# Patient Record
Sex: FEMALE | Race: OTHER | HISPANIC OR LATINO
[De-identification: names, ages, dates, MRNs, and addresses within clinical notes are randomized per-mention and may not be internally consistent; named-entity substitution may affect disease eponyms.]

---

## 2023-05-16 PROBLEM — Z00.00 ENCOUNTER FOR PREVENTIVE HEALTH EXAMINATION: Status: ACTIVE | Noted: 2023-05-16

## 2023-05-24 ENCOUNTER — APPOINTMENT (OUTPATIENT)
Dept: UROGYNECOLOGY | Facility: CLINIC | Age: 73
End: 2023-05-24
Payer: MEDICARE

## 2023-05-24 VITALS
SYSTOLIC BLOOD PRESSURE: 135 MMHG | DIASTOLIC BLOOD PRESSURE: 81 MMHG | WEIGHT: 177 LBS | TEMPERATURE: 98.2 F | OXYGEN SATURATION: 96 % | RESPIRATION RATE: 15 BRPM | HEART RATE: 73 BPM

## 2023-05-24 PROCEDURE — 51701 INSERT BLADDER CATHETER: CPT

## 2023-05-24 PROCEDURE — 99204 OFFICE O/P NEW MOD 45 MIN: CPT | Mod: 25

## 2023-05-24 NOTE — DISCUSSION/SUMMARY
[FreeTextEntry1] : \par Images were used to demonstrate her incontinence and treatment options. We reviewed management options for stress urinary incontinence including: observation, pelvic floor exercises with or without a physical therapist, continence devices or pessaries, periurethral bulking agents, and surgical management. We discussed surgical management options including a midurethral sling, sheridan colposuspension, and pubovaginal sling using native tissue. We reviewed risks and benefits of each procedure. If interested in surgery, we discussed obtaining urodynamics. We also discussed the perioperative course and reviewed postoperative restrictions of complete pelvic rest and avoidance of strenuous exercise/heavy lifting (>10lb) for 6 weeks. \par \par At this time, she would like to start with PFE with physical therapy. Referral given to patient. \par \par [facility] handout on KIT and PFE given to patient in Hungarian. RTO 3 months for KIT follow up.

## 2023-05-24 NOTE — PHYSICAL EXAM
[Chaperone Present] : A chaperone was present in the examining room during all aspects of the physical examination [Labia Majora] : were normal [Labia Minora] : were normal [Normal Appearance] : general appearance was normal [Atrophy] : atrophy [No Bleeding] : there was no active vaginal bleeding [Normal] : no abnormalities [Exam Deferred] : was deferred [Scar] : a scar was noted [FreeTextEntry1] : General: Well, appearing. Alert and orientated. No acute distress\par HEENT: Normocephalic, atraumatic and extraocular muscles appear to be intact \par Neck: Full range of motion, no obvious lymphadenopathy, deformities, or masses noted \par Respiratory: Speaking in full sentences comfortably, normal work of breathing and no cough during visit\par Musculoskeletal: full range of motion\par Extremities: No upper extremity edema noted\par Skin: no obvious rash or skin lesions\par Neuro: Orientated X 3, speech is fluent, normal rate\par Psych: Normal mood and affect  [Tenderness] : ~T no ~M abdominal tenderness observed [Distended] : not distended [FreeTextEntry3] : (+) hypermobility, (-) cough stress test

## 2023-05-24 NOTE — REASON FOR VISIT
[Initial Visit ___] : an initial visit for [unfilled] [Urinary Incontinence] : urinary incontinence [[employer] ] : provided by [employer]   [Interpreters_IDNumber] : [id number] [Interpreters_FullName] : Ronnie [TWNoteComboBox1] : [place]

## 2023-05-24 NOTE — HISTORY OF PRESENT ILLNESS
[Cystocele (Obstetric)] : no [Vaginal Wall Prolapse] : no [Rectal Prolapse] : no [Unable To Restrain Bowel Movement] : no [Urinary Frequency] : no [Feelings Of Urinary Urgency] : no [Pain During Urination (Dysuria)] : no [Urinary Tract Infection] : no [] : years ago [Stool Visible Blood] : no [Incomplete Emptying Of Stool] : no [Pelvic Pain] : no [Vaginal Pain] : no [Rectal Pain] : no [de-identified] : with running, sneezing, every other day  [de-identified] : 3-4x/d [de-identified] : 0-1x/n [FreeTextEntry6] : daily BM, sometimes has constipation, ~1x/m  [FreeTextEntry1] : \par Jie is a 73-year-old who presents with bothersome leakage with laughing, sneezing. Denies any leakage with urge. Denies frequency, urgency, incomplete emptying. Denies prior evaluation or treatment. 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 Occasionally 1 can of soda

## 2023-05-24 NOTE — PROCEDURE
[Straight Catheterization] : insertion of a straight catheter [Urinary Tract Infection] : a urinary tract infection [___ Fr Straight Tip] : a [unfilled] in [place] straight tip catheter [None] : there were no complications with the catheter insertion [Clear] : clear [Culture] : culture

## 2023-05-24 NOTE — LETTER BODY
[Dear  ___] : Dear  [unfilled], [I had the pleasure of evaluating your patient, [unfilled]. Thank you for referring Ms. [unfilled] for consultation for ___] : I had the pleasure of evaluating your patient, [unfilled]. Thank you for referring Ms. [unfilled] for consultation for [unfilled]. [Attached please find my note.] : Attached please find my note. [Thank you very much for allowing me to participate in the care of this patient. If you have any questions, please do not hesitate to contact me] : Thank you very much for allowing me to participate in the care of this patient. If you have any questions, please do not hesitate to contact me. [FreeTextEntry1] : stress incontinence

## 2023-05-26 LAB — BACTERIA UR CULT: NORMAL

## 2023-09-19 ENCOUNTER — APPOINTMENT (OUTPATIENT)
Dept: UROGYNECOLOGY | Facility: CLINIC | Age: 73
End: 2023-09-19

## 2023-10-31 ENCOUNTER — APPOINTMENT (OUTPATIENT)
Dept: UROGYNECOLOGY | Facility: CLINIC | Age: 73
End: 2023-10-31

## 2024-06-11 ENCOUNTER — APPOINTMENT (OUTPATIENT)
Dept: UROGYNECOLOGY | Facility: CLINIC | Age: 74
End: 2024-06-11
Payer: MEDICARE

## 2024-06-11 VITALS
BODY MASS INDEX: 30.65 KG/M2 | WEIGHT: 173 LBS | TEMPERATURE: 97.9 F | DIASTOLIC BLOOD PRESSURE: 72 MMHG | OXYGEN SATURATION: 98 % | HEART RATE: 67 BPM | SYSTOLIC BLOOD PRESSURE: 130 MMHG | HEIGHT: 63 IN

## 2024-06-11 DIAGNOSIS — R35.1 NOCTURIA: ICD-10-CM

## 2024-06-11 PROCEDURE — 51701 INSERT BLADDER CATHETER: CPT

## 2024-06-11 PROCEDURE — 99213 OFFICE O/P EST LOW 20 MIN: CPT | Mod: 25

## 2024-06-11 PROCEDURE — 99459 PELVIC EXAMINATION: CPT

## 2024-06-11 NOTE — PROCEDURE
[Straight Catheterization] : insertion of a straight catheter [Urinary Tract Infection] : a urinary tract infection [___ Fr Straight Tip] : a [unfilled] in [place] straight tip catheter [None] : there were no complications with the catheter insertion [Clear] : clear [Culture] : culture

## 2024-06-11 NOTE — HISTORY OF PRESENT ILLNESS
[Cystocele (Obstetric)] : no [Vaginal Wall Prolapse] : no [Rectal Prolapse] : no [Urinary Tract Infection] : no [Urinary Frequency] : no [Feelings Of Urinary Urgency] : no [Pain During Urination (Dysuria)] : no [] : years ago [Stool Visible Blood] : no [Incomplete Emptying Of Stool] : no [Pelvic Pain] : no [Vaginal Pain] : no [Rectal Pain] : no [Unable To Restrain Bowel Movement] : no [de-identified] : with walking, running, sneezing but unsure if she has an urge   [de-identified] : 3-4x/d [de-identified] : 2-3x/n [de-identified] : daily [FreeTextEntry6] : daily BM, sometimes has constipation, ~1x/m  [FreeTextEntry1] : Jie is a 74-year-old with stress incontinence. She was last seen on 5/24/23 and was started on pelvic floor exercises and physical therapy. She did not go to PT because the waitlist was too long. She tried doing PFE daily on her own but did not have any improvement in her symptoms. In addition, she reports having more urgency and nocturia than at her last visit.   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 1c coffee 2-3x/wk.

## 2024-06-11 NOTE — DISCUSSION/SUMMARY
[FreeTextEntry1] :   We reviewed her urinary symptoms and exam findings and discussed possible etiologies including urinary tract infection, overhydration, and mixed urinary incontinence. Cath urine specimen was sent for urine culture to rule out urinary tract infection. Jie also had some LE edema on exam today. We discussed how the extraneous fluid returns to circulation at night when her legs are elevated, which could be why she has had more nocturia. We discussed elevating her LE in the afternoon and wearing compression socks. In addition, we discussed avoidance of bladder irritants such as caffeine, carbonation, artificial sweeteners, alcohol, acidic foods/drinks (citrus, tomatoes, pineapple), spicy foods, and chocolate. We discussed she is drinking a lot of fluids and whatever she drinks will need to be voided. Instead, we discussed drinking 1.5-2L of fluids to stay hydrated.   To further evaluate her urinary incontinence, she will return for urodynamics. We discussed the management options for stress incontinence vs overactive bladder/urgency incontinence are very different.  RTO for UDS and then RPA afterwards.

## 2024-06-11 NOTE — PHYSICAL EXAM
[Chaperone Present] : A chaperone was present in the examining room during all aspects of the physical examination [Labia Majora] : were normal [Labia Minora] : were normal [Normal Appearance] : general appearance was normal [Atrophy] : atrophy [No Bleeding] : there was no active vaginal bleeding [Normal] : no abnormalities [Exam Deferred] : was deferred [06241] : A chaperone was present during the pelvic exam. [FreeTextEntry2] : Emily [FreeTextEntry1] : General: Well, appearing. Alert and orientated. No acute distress HEENT: Normocephalic, atraumatic and extraocular muscles appear to be intact  Neck: Full range of motion, no obvious lymphadenopathy, deformities, or masses noted  Respiratory: Speaking in full sentences comfortably, normal work of breathing and no cough during visit Musculoskeletal: full range of motion Extremities: No upper extremity edema noted, 1+ LE edema bilaterally Skin: no obvious rash or skin lesions Neuro: Orientated X 3, speech is fluent, normal rate Psych: Normal mood and affect  [Tenderness] : ~T no ~M abdominal tenderness observed [Distended] : not distended [de-identified] : No prolapse

## 2024-06-11 NOTE — REASON FOR VISIT
[Follow-Up Visit_____] : a follow-up visit for [unfilled] [Family Member] : family member [[employer] ] : provided by [employer]   [Interpreters_IDNumber] : [id number] [Interpreters_FullName] : Zac [TWNoteComboBox1] : [place]

## 2024-06-13 LAB — BACTERIA UR CULT: NORMAL

## 2024-06-19 ENCOUNTER — APPOINTMENT (OUTPATIENT)
Dept: UROGYNECOLOGY | Facility: CLINIC | Age: 74
End: 2024-06-19
Payer: MEDICARE

## 2024-06-19 VITALS
HEART RATE: 69 BPM | WEIGHT: 176 LBS | DIASTOLIC BLOOD PRESSURE: 73 MMHG | BODY MASS INDEX: 31.18 KG/M2 | OXYGEN SATURATION: 96 % | TEMPERATURE: 97.6 F | HEIGHT: 63 IN | SYSTOLIC BLOOD PRESSURE: 125 MMHG

## 2024-06-19 PROCEDURE — 51797 INTRAABDOMINAL PRESSURE TEST: CPT

## 2024-06-19 PROCEDURE — 51784 ANAL/URINARY MUSCLE STUDY: CPT

## 2024-06-19 PROCEDURE — 51729 CYSTOMETROGRAM W/VP&UP: CPT

## 2024-06-25 ENCOUNTER — APPOINTMENT (OUTPATIENT)
Dept: UROGYNECOLOGY | Facility: CLINIC | Age: 74
End: 2024-06-25
Payer: MEDICARE

## 2024-06-25 VITALS
OXYGEN SATURATION: 96 % | HEART RATE: 69 BPM | WEIGHT: 176 LBS | BODY MASS INDEX: 31.18 KG/M2 | SYSTOLIC BLOOD PRESSURE: 149 MMHG | DIASTOLIC BLOOD PRESSURE: 76 MMHG | TEMPERATURE: 97.6 F | HEIGHT: 63 IN

## 2024-06-25 DIAGNOSIS — N39.3 STRESS INCONTINENCE (FEMALE) (MALE): ICD-10-CM

## 2024-06-25 DIAGNOSIS — R39.15 URGENCY OF URINATION: ICD-10-CM

## 2024-06-25 DIAGNOSIS — N32.81 OVERACTIVE BLADDER: ICD-10-CM

## 2024-06-25 PROCEDURE — 99214 OFFICE O/P EST MOD 30 MIN: CPT

## 2024-06-25 NOTE — REASON FOR VISIT
[Follow-Up Visit_____] : a follow-up visit for [unfilled] [Family Member] : family member [[employer] ] : provided by [employer]   [Interpreters_IDNumber] : [id number] [Interpreters_FullName] : Meena [[email or web address]] : Citizen of [place]

## 2024-06-25 NOTE — PHYSICAL EXAM
No [FreeTextEntry1] : General: Well, appearing. Alert and orientated. No acute distress HEENT: Normocephalic, atraumatic and extraocular muscles appear to be intact Neck: Full range of motion, no obvious lymphadenopathy, deformities, or masses noted Respiratory: Speaking in full sentences comfortably, normal work of breathing and no cough during visit Musculoskeletal: full range of motion  Extremities: No upper extremity edema noted Skin: no obvious rash or skin lesions Neuro: Orientated X 3, speech is fluent, normal rate Psych: Normal mood and affects

## 2024-06-25 NOTE — HISTORY OF PRESENT ILLNESS
[Cystocele (Obstetric)] : no [Vaginal Wall Prolapse] : no [Rectal Prolapse] : no [Unable To Restrain Bowel Movement] : no [Urinary Tract Infection] : no [Urinary Frequency] : no [Feelings Of Urinary Urgency] : no [Pain During Urination (Dysuria)] : no [] : years ago [Stool Visible Blood] : no [Incomplete Emptying Of Stool] : no [Pelvic Pain] : no [Vaginal Pain] : no [Rectal Pain] : no [de-identified] : with walking, running, sneezing but unsure if she has an urge   [de-identified] : 3-4x/d [de-identified] : 2-3x/n [de-identified] : daily [FreeTextEntry6] : daily BM, sometimes has constipation, ~1x/m  [FreeTextEntry1] : Jie is a 74-year-old with stress incontinence. She underwent urodynamics on 6/19/24 that confirmed stress leakage. No DO, ISD. Her PVR was 0ml and prison 313ml.   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 1c coffee 2-3x/wk.

## 2024-06-25 NOTE — DISCUSSION/SUMMARY
[FreeTextEntry1] :   We reviewed her urodynamics findings of stress incontinence. We discussed she could still have an overactive bladder even though her urodynamics did not show any detrusor overactivity. Images were used to demonstrate her stress incontinence and treatment options. We reviewed management options for stress urinary incontinence including: observation, pelvic floor exercises with or without a physical therapist, continence devices or pessaries, periurethral bulking agents, and surgical management. We discussed surgical management options including a midurethral sling, sheridan colposuspension, and pubovaginal sling using native tissue. We reviewed risks and benefits of each procedure. We also discussed the perioperative course and reviewed postoperative restrictions of complete pelvic rest and avoidance of strenuous exercise/heavy lifting (>10lb) for 6 weeks. We discussed surgery does not address urinary frequency or urgency and those are symptoms of an overactive bladder. We discussed management options for overactive bladder including observation, fluid and behavioral modifications, bladder training, physical therapy and medications including anticholinergics and beta 3 agonists. Specifically, we discussed avoidance of bladder irritants such as caffeine, carbonation, artificial sweeteners, alcohol, acidic foods/drinks (citrus, tomatoes, pineapple), spicy foods, and chocolate. We also discussed drinking 1-1.5L of plain water to maintain adequate hydration as urine that is too concentrated can also be irritating to the bladder. In addition, we reviewed drinking slowly since ingesting large quantities of fluid can result in rapid distension of the bladder, which can worsen urinary symptoms.   She is interested in surgical correction of her stress incontinence with a midurethral sling. She understands it is made of synthetic mesh and that it is a foreign body. We discussed the risk of pain, infection, reoperation. She understands and is still interested in surgical correction. Booking slip placed. She will make the fluid and behavioral modifications in the meantime.   [facility] handout on MUS, OAB, bladder given to patient in [place].